# Patient Record
Sex: FEMALE
[De-identification: names, ages, dates, MRNs, and addresses within clinical notes are randomized per-mention and may not be internally consistent; named-entity substitution may affect disease eponyms.]

---

## 2023-05-09 ENCOUNTER — APPOINTMENT (OUTPATIENT)
Dept: ANTEPARTUM | Facility: CLINIC | Age: 28
End: 2023-05-09
Payer: MEDICAID

## 2023-05-09 ENCOUNTER — APPOINTMENT (OUTPATIENT)
Dept: OBGYN | Facility: CLINIC | Age: 28
End: 2023-05-09
Payer: MEDICAID

## 2023-05-09 VITALS
DIASTOLIC BLOOD PRESSURE: 65 MMHG | BODY MASS INDEX: 31.58 KG/M2 | WEIGHT: 185 LBS | SYSTOLIC BLOOD PRESSURE: 99 MMHG | HEIGHT: 64 IN

## 2023-05-09 PROCEDURE — 76830 TRANSVAGINAL US NON-OB: CPT

## 2023-05-09 PROCEDURE — 99203 OFFICE O/P NEW LOW 30 MIN: CPT

## 2023-05-09 PROCEDURE — 76857 US EXAM PELVIC LIMITED: CPT

## 2023-05-09 NOTE — PLAN
[FreeTextEntry1] : 27 year old presenting for consultation for infertility.\par -patient counseled that infertility is defined as regular intercourse without conception after 1 year in women <35 years old and after 6 months in women >35 years old\par -menstrual cycle reviewed and patient advised to use period tracker nannette to monitor for days of peak fertility\par -patient also instructed to use ovulation kits. She is to start testing 2-3 days prior to expected ovulation based on a period tracker nannette. Once positive LH surge, patient instructed to have intercourse for 3 consecutive days with partner.\par -patient will need pelvic sonogram to rule out intrauterine pathology\par -patient counseled that 50% of infertility is male factor, partner should be evaluated by urology\par -f/u blood work done today\par -f/u 1 week for annual exam \par -referral given to fertility Dr.Vidya Michelle

## 2023-05-09 NOTE — HISTORY OF PRESENT ILLNESS
[FreeTextEntry1] : Patient is a 27 year old presenting for consultation for infertility. She and her partner have been trying to conceive x 3 years. She has been using ovulation kits with minimal success. Pt is currently on her menses and states it is very painful. She has normal cycles and no medical problems. She was worked up in [facility] and was given meds to conceive

## 2023-05-17 ENCOUNTER — LABORATORY RESULT (OUTPATIENT)
Age: 28
End: 2023-05-17

## 2023-05-17 ENCOUNTER — APPOINTMENT (OUTPATIENT)
Dept: OBGYN | Facility: CLINIC | Age: 28
End: 2023-05-17
Payer: MEDICAID

## 2023-05-17 VITALS
BODY MASS INDEX: 31.24 KG/M2 | WEIGHT: 183 LBS | SYSTOLIC BLOOD PRESSURE: 99 MMHG | DIASTOLIC BLOOD PRESSURE: 65 MMHG | HEIGHT: 64 IN

## 2023-05-17 DIAGNOSIS — E03.9 HYPOTHYROIDISM, UNSPECIFIED: ICD-10-CM

## 2023-05-17 DIAGNOSIS — Z78.9 OTHER SPECIFIED HEALTH STATUS: ICD-10-CM

## 2023-05-17 DIAGNOSIS — Z00.00 ENCOUNTER FOR GENERAL ADULT MEDICAL EXAMINATION W/OUT ABNORMAL FINDINGS: ICD-10-CM

## 2023-05-17 DIAGNOSIS — N76.0 ACUTE VAGINITIS: ICD-10-CM

## 2023-05-17 DIAGNOSIS — Z31.69 ENCOUNTER FOR OTHER GENERAL COUNSELING AND ADVICE ON PROCREATION: ICD-10-CM

## 2023-05-17 DIAGNOSIS — Z82.49 FAMILY HISTORY OF ISCHEMIC HEART DISEASE AND OTHER DISEASES OF THE CIRCULATORY SYSTEM: ICD-10-CM

## 2023-05-17 PROCEDURE — 99395 PREV VISIT EST AGE 18-39: CPT | Mod: 25

## 2023-05-17 PROCEDURE — 99214 OFFICE O/P EST MOD 30 MIN: CPT | Mod: 25

## 2023-05-18 PROBLEM — Z82.49 FAMILY HISTORY OF HYPERTENSION: Status: ACTIVE | Noted: 2023-05-18

## 2023-05-18 PROBLEM — E03.9 HYPOTHYROIDISM, UNSPECIFIED TYPE: Status: ACTIVE | Noted: 2023-05-18

## 2023-05-18 PROBLEM — Z31.69 ENCOUNTER FOR PRECONCEPTION CONSULTATION: Status: ACTIVE | Noted: 2023-05-17

## 2023-05-18 PROBLEM — Z78.9 NON-SMOKER: Status: ACTIVE | Noted: 2023-05-18

## 2023-05-18 PROBLEM — N76.0 VAGINITIS: Status: ACTIVE | Noted: 2023-05-17

## 2023-05-18 NOTE — HISTORY OF PRESENT ILLNESS
[N] : Patient does not use contraception [Y] : Patient is sexually active [Monogamous (Male Partner)] : is monogamous with a male partner [Currently Active] : currently active [Men] : men [Vaginal] : vaginal [No] : No [FreeTextEntry1] : Patient is a 27 year  y/o presenting for an annual visit.  She is feeling well and is without complaints. She denies vaginal itching, odor and discharge. Denies urinary urgency, frequency and dysuria. Patient with regular menstrual cycles with LMP 05/07/2023, and has been trying to conceive for a few years. Patient reports that she was worked up in her country for infertility. Patient reports she has been trying actively for 3 months, and that often her partner is not around due to this work. Patient reports she has a h/o of hypothyroidism and is on levothyroxine 200mcg. Last visit Dr Bourgeois's unofficial scan revealed an ovarian cyst. [employer]  ID# [id number] used for visit  [LMPDate] : 05/07/2023 [MensesFreq] : 30 [MensesLength] : 5

## 2023-05-18 NOTE — PLAN
[FreeTextEntry1] : 27 year  y/o P0 presenting for annual exam\par -f/u pap and GC/CT\par -bilateral breast exam \par -Contraception: none \par -Patient counseled that infertility is defined as regular intercourse without conception after 1 year in women <35 years old and after 6 months in women >35 years old\par -Menstrual cycle reviewed and patient advised to use period tracker nannette to monitor for days of peak fertility\par -Patient also instructed to use ovulation kits. She is to start testing 2-3 days prior to expected ovulation based on a period tracker nannette. Once positive LH surge, patient instructed to have intercourse for 3 consecutive days with partner. recommended clear blue, patient concerned as  is a  and is away a lot, educated patient that if they are not together during those times she will not conceive. \par -f/u blood work done today\par -patient will need Pelvic sonogram to rule out intrauterine pathology\par -Patient counseled that 50% of infertility is male factor, partner should be evaluated by urology\par - recommended to start PNV\par - Genetic screening through [facility] sent \par -f/u for GYN sonogram, unofficial sonogram revealed  ovarian cyst.\par

## 2023-05-18 NOTE — COUNSELING
[Nutrition/ Exercise/ Weight Management] : nutrition, exercise, weight management [Body Image] : body image [Breast Self Exam] : breast self exam [Preconception Care/ Fertility options] : preconception care, fertility options [Pregnancy Options] : pregnancy options [Other ___] : [unfilled]

## 2023-05-18 NOTE — PHYSICAL EXAM
[Chaperone Present] : A chaperone was present in the examining room during all aspects of the physical examination [FreeTextEntry1] : [place] [Appropriately responsive] : appropriately responsive [Alert] : alert [No Acute Distress] : no acute distress [No Lymphadenopathy] : no lymphadenopathy [Soft] : soft [Non-tender] : non-tender [Non-distended] : non-distended [No HSM] : No HSM [No Lesions] : no lesions [No Mass] : no mass [Oriented x3] : oriented x3 [Examination Of The Breasts] : a normal appearance [No Masses] : no breast masses were palpable [Labia Majora] : normal [Labia Minora] : normal [Normal] : normal [Uterine Adnexae] : normal

## 2023-05-19 LAB
BASOPHILS # BLD AUTO: 0.05 K/UL
BASOPHILS NFR BLD AUTO: 0.8 %
EOSINOPHIL # BLD AUTO: 0.32 K/UL
EOSINOPHIL NFR BLD AUTO: 4.8 %
HCT VFR BLD CALC: 40.1 %
HGB BLD-MCNC: 12.4 G/DL
IMM GRANULOCYTES NFR BLD AUTO: 0.2 %
LYMPHOCYTES # BLD AUTO: 2.24 K/UL
LYMPHOCYTES NFR BLD AUTO: 33.8 %
MAN DIFF?: NORMAL
MCHC RBC-ENTMCNC: 28.9 PG
MCHC RBC-ENTMCNC: 30.9 GM/DL
MCV RBC AUTO: 93.5 FL
MONOCYTES # BLD AUTO: 0.35 K/UL
MONOCYTES NFR BLD AUTO: 5.3 %
NEUTROPHILS # BLD AUTO: 3.66 K/UL
NEUTROPHILS NFR BLD AUTO: 55.1 %
PLATELET # BLD AUTO: 296 K/UL
PROLACTIN SERPL-MCNC: 50.9 NG/ML
RBC # BLD: 4.29 M/UL
RBC # FLD: 14.3 %
WBC # FLD AUTO: 6.63 K/UL

## 2023-05-21 LAB
ABO + RH PNL BLD: NORMAL
ALBUMIN SERPL ELPH-MCNC: 4.4 G/DL
ALP BLD-CCNC: 55 U/L
ALT SERPL-CCNC: 15 U/L
ANION GAP SERPL CALC-SCNC: 12 MMOL/L
ANTI-MUELLERIAN HORMONE: 2.54 NG/ML
AST SERPL-CCNC: 23 U/L
BILIRUB SERPL-MCNC: 0.4 MG/DL
BLD GP AB SCN SERPL QL: NORMAL
BUN SERPL-MCNC: 15 MG/DL
C TRACH RRNA SPEC QL NAA+PROBE: NOT DETECTED
CALCIUM SERPL-MCNC: 9.6 MG/DL
CANDIDA VAG CYTO: NOT DETECTED
CHLORIDE SERPL-SCNC: 106 MMOL/L
CMV IGG SERPL QL: 7 U/ML
CMV IGG SERPL-IMP: POSITIVE
CMV IGM SERPL QL: <8 AU/ML
CMV IGM SERPL QL: NEGATIVE
CO2 SERPL-SCNC: 26 MMOL/L
CREAT SERPL-MCNC: 0.71 MG/DL
CYTOLOGY CVX/VAG DOC THIN PREP: NORMAL
EGFR: 119 ML/MIN/1.73M2
ESTIMATED AVERAGE GLUCOSE: 114 MG/DL
ESTRADIOL SERPL-MCNC: 138 PG/ML
FSH SERPL-MCNC: 5.1 IU/L
G VAGINALIS+PREV SP MTYP VAG QL MICRO: NOT DETECTED
GLUCOSE SERPL-MCNC: 97 MG/DL
HBA1C MFR BLD HPLC: 5.6 %
HBV E AG SER QL: NONREACTIVE
HCV AB SER QL: NONREACTIVE
HCV S/CO RATIO: 0.1 S/CO
LH SERPL-ACNC: 8.9 IU/L
N GONORRHOEA RRNA SPEC QL NAA+PROBE: NOT DETECTED
POTASSIUM SERPL-SCNC: 4.2 MMOL/L
PROGEST SERPL-MCNC: 0.3 NG/ML
PROT SERPL-MCNC: 7.6 G/DL
RUBV IGG FLD-ACNC: 9.4 INDEX
RUBV IGG SER-IMP: POSITIVE
RUBV IGM FLD-ACNC: <20 AU/ML
SODIUM SERPL-SCNC: 144 MMOL/L
SOURCE AMPLIFICATION: NORMAL
T GONDII AB SER-IMP: NEGATIVE
T GONDII AB SER-IMP: NEGATIVE
T GONDII IGG SER QL: <3 IU/ML
T GONDII IGM SER QL: <3 AU/ML
T PALLIDUM AB SER QL IA: NEGATIVE
T VAGINALIS VAG QL WET PREP: NOT DETECTED
T4 FREE SERPL-MCNC: 1.3 NG/DL
T4 SERPL-MCNC: 7.1 UG/DL
TESTOST SERPL-MCNC: 29.4 NG/DL
TSH SERPL-ACNC: 3.06 UIU/ML
VZV AB TITR SER: POSITIVE
VZV IGG SER IF-ACNC: 1608 INDEX

## 2023-05-23 ENCOUNTER — APPOINTMENT (OUTPATIENT)
Dept: OBGYN | Facility: CLINIC | Age: 28
End: 2023-05-23
Payer: MEDICAID

## 2023-05-23 ENCOUNTER — APPOINTMENT (OUTPATIENT)
Dept: ANTEPARTUM | Facility: CLINIC | Age: 28
End: 2023-05-23
Payer: MEDICAID

## 2023-05-23 VITALS
WEIGHT: 181 LBS | HEART RATE: 82 BPM | HEIGHT: 64 IN | SYSTOLIC BLOOD PRESSURE: 103 MMHG | DIASTOLIC BLOOD PRESSURE: 68 MMHG | BODY MASS INDEX: 30.9 KG/M2

## 2023-05-23 DIAGNOSIS — R19.8 OTHER SPECIFIED SYMPTOMS AND SIGNS INVOLVING THE DIGESTIVE SYSTEM AND ABDOMEN: ICD-10-CM

## 2023-05-23 DIAGNOSIS — R79.89 OTHER SPECIFIED ABNORMAL FINDINGS OF BLOOD CHEMISTRY: ICD-10-CM

## 2023-05-23 DIAGNOSIS — N70.11 CHRONIC SALPINGITIS: ICD-10-CM

## 2023-05-23 DIAGNOSIS — N80.9 ENDOMETRIOSIS, UNSPECIFIED: ICD-10-CM

## 2023-05-23 PROCEDURE — 76830 TRANSVAGINAL US NON-OB: CPT | Mod: 59

## 2023-05-23 PROCEDURE — 76857 US EXAM PELVIC LIMITED: CPT

## 2023-05-23 PROCEDURE — 99214 OFFICE O/P EST MOD 30 MIN: CPT

## 2023-05-23 NOTE — HISTORY OF PRESENT ILLNESS
[FreeTextEntry1] : Patient was seen in office 05/17/2023 for annual visit and for infertility. Patient presents today to review labs and for GYN sonogram. Patient found to have elevated prolactin, fasting today for repeat prolactin level.  ID [id number] used for visit

## 2023-05-23 NOTE — PLAN
[FreeTextEntry1] : 27 yr old trying to conceive x 3 years, seen in office on 5/17/2023 for annual here today for f/u and GYN sonogram \par - GYN sonogram revealed probable endometriosis. Left ovary endometrioma, right adnexal complex unsure whether its a mass or clot or enlarged hyrdosalphinx. Ovaries appear to be "kissing" a u/s sign of endo. reviewed images and discussed with patient via . Patient disclosed in her country, she was told about endo before. discussed scaring from endo is often the cause of infertility. see official u/s report for further information  \par - prolactin redrawn, Cancer markers sent due to complex mass vs clot. discussed that people with endo often have increased CA-125\par - al previous labs reviewed \par - message sent to [place] for authorization for MRI. \par - discussed with patient after MRI and blood will discuss with Dr Mariee and Dr Bourgeois and formulate plan of care \par - all questions and concerned answered

## 2023-05-24 LAB
B19V IGG SER QL IA: POSITIVE
B19V IGG+IGM SER-IMP: NORMAL
B19V IGM FLD-ACNC: NEGATIVE

## 2023-05-25 LAB
AFP-TM SERPL-MCNC: <1.8 NG/ML
CANCER AG125 SERPL-ACNC: 31 U/ML
HCG-TM SERPL-MCNC: <1 MIU/ML
LDH SERPL-CCNC: 132 U/L

## 2023-05-26 LAB — PROLACTIN SERPL-MCNC: 57.3 NG/ML

## 2023-05-28 LAB — INHIBIN B: 19.6 PG/ML

## 2023-06-05 ENCOUNTER — RESULT REVIEW (OUTPATIENT)
Age: 28
End: 2023-06-05

## 2023-06-05 ENCOUNTER — APPOINTMENT (OUTPATIENT)
Dept: MRI IMAGING | Facility: CLINIC | Age: 28
End: 2023-06-05
Payer: MEDICAID

## 2023-06-05 PROCEDURE — A9585: CPT

## 2023-06-05 PROCEDURE — 72197 MRI PELVIS W/O & W/DYE: CPT

## 2023-06-09 ENCOUNTER — NON-APPOINTMENT (OUTPATIENT)
Age: 28
End: 2023-06-09

## 2023-06-30 ENCOUNTER — APPOINTMENT (OUTPATIENT)
Dept: OBGYN | Facility: CLINIC | Age: 28
End: 2023-06-30
Payer: MEDICAID

## 2023-06-30 PROCEDURE — 99212 OFFICE O/P EST SF 10 MIN: CPT

## 2023-06-30 RX ORDER — LEVOTHYROXINE SODIUM 0.2 MG/1
200 TABLET ORAL
Refills: 0 | Status: COMPLETED | COMMUNITY
End: 2023-06-30

## 2023-07-10 NOTE — HISTORY OF PRESENT ILLNESS
[FreeTextEntry1] : 44 year year old female presents as new patient to consult about endometriosis.LMP 06/01/23. \par has dysmenorrhea and found to have bilateral endometriomas with potential mass effect on the rectosigmoid junction as well as right hydrosalpinx. patient as been trying to get pregnant but unable to for several years\par also has persistently elevated prolactin

## 2023-07-10 NOTE — PLAN
[FreeTextEntry1] : would recommend pituitary MRI\par will refer to [facility] for management of bilateral endometriomas

## 2023-07-10 NOTE — REASON FOR VISIT
[Follow-Up] : a follow-up evaluation of [[employer] ] : provided by [employer]   [FreeTextEntry3] : [place] [TWNoteComboBox1] : Other

## 2023-07-21 ENCOUNTER — APPOINTMENT (OUTPATIENT)
Dept: OBGYN | Facility: CLINIC | Age: 28
End: 2023-07-21

## 2023-08-03 ENCOUNTER — APPOINTMENT (OUTPATIENT)
Dept: OBGYN | Facility: CLINIC | Age: 28
End: 2023-08-03

## 2023-12-20 ENCOUNTER — APPOINTMENT (OUTPATIENT)
Dept: MRI IMAGING | Facility: CLINIC | Age: 28
End: 2023-12-20

## 2024-01-04 ENCOUNTER — APPOINTMENT (OUTPATIENT)
Dept: MRI IMAGING | Facility: CLINIC | Age: 29
End: 2024-01-04
Payer: MEDICAID

## 2024-01-04 PROCEDURE — 70553 MRI BRAIN STEM W/O & W/DYE: CPT

## 2024-01-09 DIAGNOSIS — E22.9 BENIGN NEOPLASM OF PITUITARY GLAND: ICD-10-CM

## 2024-01-09 DIAGNOSIS — D35.2 BENIGN NEOPLASM OF PITUITARY GLAND: ICD-10-CM

## 2024-01-11 ENCOUNTER — NON-APPOINTMENT (OUTPATIENT)
Age: 29
End: 2024-01-11